# Patient Record
Sex: MALE
[De-identification: names, ages, dates, MRNs, and addresses within clinical notes are randomized per-mention and may not be internally consistent; named-entity substitution may affect disease eponyms.]

---

## 2024-02-07 ENCOUNTER — APPOINTMENT (OUTPATIENT)
Dept: ORTHOPEDIC SURGERY | Facility: CLINIC | Age: 59
End: 2024-02-07
Payer: COMMERCIAL

## 2024-02-07 ENCOUNTER — NON-APPOINTMENT (OUTPATIENT)
Age: 59
End: 2024-02-07

## 2024-02-07 VITALS
HEIGHT: 76 IN | DIASTOLIC BLOOD PRESSURE: 87 MMHG | SYSTOLIC BLOOD PRESSURE: 130 MMHG | BODY MASS INDEX: 20.7 KG/M2 | HEART RATE: 86 BPM | WEIGHT: 170 LBS

## 2024-02-07 DIAGNOSIS — M79.642 PAIN IN RIGHT HAND: ICD-10-CM

## 2024-02-07 DIAGNOSIS — M79.641 PAIN IN RIGHT HAND: ICD-10-CM

## 2024-02-07 PROCEDURE — 99204 OFFICE O/P NEW MOD 45 MIN: CPT

## 2024-02-07 PROCEDURE — 73110 X-RAY EXAM OF WRIST: CPT | Mod: 50

## 2024-02-07 PROCEDURE — 73130 X-RAY EXAM OF HAND: CPT | Mod: 50

## 2024-02-07 NOTE — DISCUSSION/SUMMARY
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   I had a discussion with the patient regarding today's visit, the prognosis of this diagnosis, and treatment recommendations and options. At this time, I discussed the option of surgical management.  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  In terms of surgery, I would recommend a right wrist proximal row carpectomy, PIN/AIN neurectomy and open carpal tunnel release and release of the ulnar nerve at Guyon's canal.  It is possible that his lunate may need to be excised through the carpal tunnel.  He has agreed to proceed.  He will be scheduled sometime in the near future.  -  The nature and purposes of the operation/procedure was discussed in detail.  I discussed the surgical procedure in detail, as well as expected postoperative recovery and outcome. -  Possible risks, benefits, and complications (from known and unknown causes) of the procedure were discussed in detail.  -  Possible non-operative alternatives to the proposed treatment were discussed in detail.  -  He was told that possible risks/complications include, but are not limited to:  Infection, nerve or vessel injury, stiffness, painful scar, poor outcome, need for additional surgical procedures, and other unforeseen complications.  -  In addition, the possibility of an "unsuccessful outcome," despite "successful surgery," was discussed with him.  He understands that his wrist will not be "normal."  He understands the risk of further problems secondary to arthritis, instability of his proximal carpectomy, and he also understands that given the chronic compression of his median and ulnar nerves secondary to the dislocated lunate, he may not regain full function of his nerves.  He obviously has a severe ulnar neuropathy based upon his examination and his EMGs.  Knowing this, he has agreed to proceed. -  The patient fully understands these risks and wishes to proceed.  -  I had a lengthy discussion with the patient regarding today's visit, the diagnosis, and my surgical treatment recommendations.  The patient has agreed to this plan of management and has expressed full understanding.  All questions were fully answered to the patient's satisfaction.   My cumulative time spent on this visit included: Preparation for the visit, review of the medical records, review of pertinent diagnostic studies, examination and counseling of the patient on the above diagnosis, treatment plan and prognosis, orders of diagnostic tests, medication and/or appropriate procedures and documentation in the medical records of today's visit.

## 2024-02-07 NOTE — PHYSICAL EXAM
[de-identified] : - Constitutional: This is a male in no obvious distress.  - Psych: Patient is alert and oriented to person, place and time.  Patient has a normal mood and affect. - Cardiovascular: Normal pulses throughout the upper extremities.  No significant varicosities are noted in the upper extremities. - Respiratory:  Patient exhibits no evidence of shortness of breath or difficulty breathing. - Skin: No rashes, lesions, or other abnormalities are noted in the upper extremities. ---  Examination of his right wrist and hand demonstrates obvious swelling dorsally at the wrist capsule.  He has tenderness along the dorsal wrist capsule.  He has approximately 15 degrees of wrist flexion and extension.  He has full composite flexion of the digits into the palm.  His flexor and extensor tendons are intact.  There is obvious ulnar atrophy with significant first dorsal interosseous wasting.  There is significant weakness of the ulnar nerve innervated intrinsics within the hand.  Provocative signs for carpal tunnel syndrome are positive.  In addition, he has a positive Tinel along the ulnar nerve at Guyon's canal.  Provocative signs for cubital tunnel syndrome are negative.  He has decreased sensation to light touch most notably along the ulnar nerve distribution, less so along the median nerve distribution with normal sensation along the radial nerve distribution. [de-identified] :  PA, lateral, oblique, PA  and ulna deviation radiographs of the left wrist demonstrate advanced STT joint arthritis.    PA, lateral, oblique, PA  and ulna deviation radiographs of the right wrist demonstrate evidence of what appears to be volar lunate dislocation with a vascular necrosis of the lunate.  I reviewed MRI of the right wrist dated 2/7/2024 demonstrated dorsal angulation of the lunate indicating the presence of dorsal intercalated segmental instability (DISI). Sclerosis of the lunate, consistent with presence of avascular necrosis.  His lunate is volarly dislocated towards the carpal tunnel. Complete tears of the scapholunate and lunotriquetral ligaments.  Complete tear of the styloid attachment of the triangular fibrocartilage. Partial tear of the foveal attachment.  Mild extensor carpal ulnaris tendinosis, without tears.

## 2024-02-07 NOTE — PRE-SURGICAL ASSESSMENT
[PST Chart Review] : PST Chart Review (intermediate patient risk, very low procedural risk) [2 weeks] : 2 weeks [none] : none [S: Do you SNORE loudly] : snores loudly [A: AGE over 50 years old] : over 50 years of age [G: GENDER (birth sex) = Male] : birth sex is male [3 - 4: Intermediate Risk] : 3 - 4: Intermediate Risk [Willing to accept blood - if not, specify reason >>] : Willing to accept blood [No] : no [not applicable] : not applicable [T: Are you TIRED, fatigued, or sleepy during the daytime] : not tired, fatigued, or sleepy during the daytime [O: Has anyone OBSERVED you stop breathing during your sleep] : not observed to have stopped during sleep [P: Do you have, or are you being treated for, high blood PRESSURE] : no hypertension, not treated for high blood pressure [B: BMI greater than 35 kG/m2] : BMI less than 35 kG/m2 [N: NECK circumference greater than 16 inches] : neck circumference less than 16 inches [Devices being used in treatment (i.e., pacemaker, defib, loop recorder, stimulator, pain pump, other devices/implantables)] : No devices in use in treatment [Advance Directive in Chart] : No advance directive in chart [Health Care Proxy information in chart] : No Health Care Proxy information in chart

## 2024-02-07 NOTE — HISTORY OF PRESENT ILLNESS
[Right] : right hand dominant [FreeTextEntry1] : He comes in today for evaluation of right wrist pain after he fell off a ladder about 3 months ago. He has numbness/tingling, stiffness, swelling and weakness. He states that he went to physical therapy and was seen by another hand specialist in the hospital. He also states that he had an EMG done.  He has complaints of pain, weakness and stiffness and is most concerned about weakness of his hand and numbness and tingling, most notably along the ulnar nerve distribution.  Patient is a smokes and states that he recently started back on cigarettes.   I reviewed EMG dated 12/19/2023 which demonstrated no electrophysiological evidence of cervical radiculopathy. There is evidence of a moderate right and milk left carpal tunnel syndrome (median nerve entrapment of the wrist) affecting sensory and motor components. The above electrodiagnostic study reveals evidence of a severe right sensorimotor ulnar nerve neuropathy at the wrist.

## 2024-02-09 ENCOUNTER — NON-APPOINTMENT (OUTPATIENT)
Age: 59
End: 2024-02-09

## 2024-02-14 ENCOUNTER — TRANSCRIPTION ENCOUNTER (OUTPATIENT)
Age: 59
End: 2024-02-14

## 2024-02-14 NOTE — PRE PROCEDURE NOTE - PRE PROCEDURE EVALUATION
Name:  SARAH BETH TIDWELL  MRN:  [id number]  Appointment Date:  2024  1:00PM  :  1965  Documented By:  JUAN BLANCO  Documented Status:  Amended, Final        Reason For Visit       Hand & Wrist - Reason for Visit: SARAH BETH TIDWELL is being seen for an initial visit for. bilateral wrists.      History of Present Illness       HPI:    SARAH BETH TIDWELL is a 58 year old right hand dominant male. He comes in today for evaluation of right wrist pain after he fell off a ladder about 3 months ago. He has numbness/tingling, stiffness, swelling and weakness. He states that he went to physical therapy and was seen by another hand specialist in the hospital. He also states that he had an EMG done. He has complaints of pain, weakness and stiffness and is most concerned about weakness of his hand and numbness and tingling, most notably along the ulnar nerve distribution.    Patient is a smokes and states that he recently started back on cigarettes.     I reviewed EMG dated 2023 which demonstrated no electrophysiological evidence of cervical radiculopathy. There is evidence of a moderate right and milk left carpal tunnel syndrome (median nerve entrapment of the wrist) affecting sensory and motor components. The above electrodiagnostic study reveals evidence of a severe right sensorimotor ulnar nerve neuropathy at the wrist.      Past Medical History     1. History of Pain in both hands (729.5) (M79.641,M79.642)           Review of Systems       Complete ROS:    Hand Dominance: right.    Neurological:. see HPI.    Constitutional, Eyes, ENT, Cardiovascular, Respiratory, Gastrointestinal, Genitourinary, Integumentary, Psychiatric, Endocrine, Heme/Lymph and Allergic/Immunologic review of systems are otherwise negative except as noted in HPI.      Vitals  Vital Signs   Recorded: [id number] 01:52PM   Systolic: 130  Diastolic: 87  Height: 6 ft 4 in  Weight: 170 lb   BMI Calculated: 20.69 kg/m2  BSA Calculated: 2.07 m2  Height Comment: Stated  Weight Comment: Stated  Heart Rate: 86              Physical Exam       - Constitutional: This is a male in no obvious distress.   - Psych: Patient is alert and oriented to person, place and time. Patient has a normal mood and affect.  - Cardiovascular: Normal pulses throughout the upper extremities. No significant varicosities are noted in the upper extremities.  - Respiratory: Patient exhibits no evidence of shortness of breath or difficulty breathing.  - Skin: No rashes, lesions, or other abnormalities are noted in the upper extremities.  ---    Examination of his right wrist and hand demonstrates obvious swelling dorsally at the wrist capsule. He has tenderness along the dorsal wrist capsule. He has approximately 15 degrees of wrist flexion and extension. He has full composite flexion of the digits into the palm. His flexor and extensor tendons are intact. There is obvious ulnar atrophy with significant first dorsal interosseous wasting. There is significant weakness of the ulnar nerve innervated intrinsics within the hand. Provocative signs for carpal tunnel syndrome are positive. In addition, he has a positive Tinel along the ulnar nerve at Guyon's canal. Provocative signs for cubital tunnel syndrome are negative. He has decreased sensation to light touch most notably along the ulnar nerve distribution, less so along the median nerve distribution with normal sensation along the radial nerve distribution.     Imaging: PA, lateral, oblique, PA  and ulna deviation radiographs of the left wrist demonstrate advanced STT joint arthritis.      PA, lateral, oblique, PA  and ulna deviation radiographs of the right wrist demonstrate evidence of what appears to be volar lunate dislocation with a vascular necrosis of the lunate.    I reviewed MRI of the right wrist dated 2024 demonstrated dorsal angulation of the lunate indicating the presence of dorsal intercalated segmental instability (DISI). Sclerosis of the lunate, consistent with presence of avascular necrosis. His lunate is volarly dislocated towards the carpal tunnel.  Complete tears of the scapholunate and lunotriquetral ligaments.   Complete tear of the styloid attachment of the triangular fibrocartilage. Partial tear of the foveal attachment.   Mild extensor carpal ulnaris tendinosis, without tears.         Pre-Surgical Assessment          Recommended PST type based on risk: PST Chart Review (intermediate patient risk, very low procedural risk)        Surgery or procedure potential timeframe: 2 weeks    Anesthesia History: Previous Reactions to Anesthesia: none    STOP BANG Assessment: snores loudly, over 50 years of age and birth sex is male, but not tired, fatigued, or sleepy during the daytime, not observed to have stopped during sleep, no hypertension, not treated for high blood pressure, BMI less than 35 kG/m2 and neck circumference less than 16 inches    STOP BANG Score: 3 - 4: Intermediate Risk    Transfusion History - Blood Acceptance/Avoidance/Restrictions: Willing to accept blood    Previous blood transfusion: no    External/internal devices being used in treatment of patient: No devices in use in treatment    Reproductive, Female - Is patient pregnant? 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     I had a discussion with the patient regarding today's visit, the prognosis of this diagnosis, and treatment recommendations and options. At this time, I discussed the option of surgical management. 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 In terms of surgery, I would recommend a right wrist proximal row carpectomy, PIN/AIN neurectomy and open carpal tunnel release and release of the ulnar nerve at Guyon's canal. It is possible that his lunate may need to be excised through the carpal tunnel. He has agreed to proceed. He will be scheduled sometime in the near future.    - The nature and purposes of the operation/procedure was discussed in detail. I discussed the surgical procedure in detail, as well as expected postoperative recovery and outcome.  - Possible risks, benefits, and complications (from known and unknown causes) of the procedure were discussed in detail.   - Possible non-operative alternatives to the proposed treatment were discussed in detail.   - He was told that possible risks/complications include, but are not limited to: Infection, nerve or vessel injury, stiffness, painful scar, poor outcome, need for additional surgical procedures, and other unforeseen complications.   - In addition, the possibility of an "unsuccessful outcome," despite "successful surgery," was discussed with him. He understands that his wrist will not be "normal." He understands the risk of further problems secondary to arthritis, instability of his proximal carpectomy, and he also understands that given the chronic compression of his median and ulnar nerves secondary to the dislocated lunate, he may not regain full function of his nerves. He obviously has a severe ulnar neuropathy based upon his examination and his EMGs. Knowing this, he has agreed to proceed.  - The patient fully understands these risks and wishes to proceed.   - I had a lengthy discussion with the patient regarding today's visit, the diagnosis, and my surgical treatment recommendations. The patient has agreed to this plan of management and has expressed full understanding. All questions were fully answered to the patient's satisfaction.     My cumulative time spent on this visit included: Preparation for the visit, review of the medical records, review of pertinent diagnostic studies, examination and counseling of the patient on the above diagnosis, treatment plan and prognosis, orders of diagnostic tests, medication and/or appropriate procedures and documentation in the medical records of today's visit.         Assessment     1. Dislocation of lunate bone of right wrist (833.09) (S63.094A)   2. Carpal tunnel syndrome of right wrist (354.0) (G56.01)   3. Neuropathy of right ulnar nerve at wrist (354.2) (G56.21)           Plan     1. Surgery Booking Form Inpatient  .  Status: Active  Requested for: 2024  Did the patient receive COVID 19 vaccine? : Yes, Patient stated  ERP (Enhanced Recovery Program) : No  Flouro : N  X-Ray : N  MRI : N  AICD : N  Pacer : N  Diabetic : N  Latex Allergy : N  Frozen Section : N  Biopsy Done : N  Medical Clearance : Yes  PST : Required  Admission Type : OP  Anesthesia Alert : N  Anesthesia Type : Regional  Laterality : Right  See Attached Form : Mini C arm  Special Equipment : Y  Length of Procedure : 2.5 hours  Procedure Description: : 1. Right wrist proximal row carpectomy (60848)      2. Right wrist posterior interosseous nerve neurectomy (17168)      3. Right wrist anterior interosseous nerve neurectomy (38671)      4. Right open carpal tunnel release      5. Right ulnar nerve release at Guyon's canal (28526)  CPT Codes : 41798, 98363, 30962, 18719, 48610  Weight : 170  Height : 6'4  Date of Surgery and Time : 2024  Policy # : .     2. Xray Hand AP + Lat + Obl, Bilat; Status:Complete - Retrospective By Protocol   Authorization;   Done: 2024   3. Xray Wrist 3 Views, Bilateral; Status:Complete - Retrospective By Protocol Authorization;     Done: 2024              Signatures        Electronically signed by : DAYANA MCPHERSON, ; 2024  3:07PM Eastern Standard Time (Author)    Electronically signed by : KEILA HAYES NP; 2024  3:10PM Eastern Standard Time (Author)    Electronically signed by : JUAN BLANCO MD; 2024  4:51PM Eastern Standard Time (Author)

## 2024-02-14 NOTE — PRE PROCEDURE NOTE - GENERAL PROCEDURE NAME
Right wrist proximal row carpectomy/right wrist posterior neurectomy/right wrist anterior interosseous nerve neurectomy/right open carpal tunnel release/right ulnar nerve release

## 2024-02-15 ENCOUNTER — RESULT REVIEW (OUTPATIENT)
Age: 59
End: 2024-02-15

## 2024-02-15 ENCOUNTER — TRANSCRIPTION ENCOUNTER (OUTPATIENT)
Age: 59
End: 2024-02-15

## 2024-02-15 ENCOUNTER — OUTPATIENT (OUTPATIENT)
Dept: OUTPATIENT SERVICES | Facility: HOSPITAL | Age: 59
LOS: 1 days | End: 2024-02-15
Payer: COMMERCIAL

## 2024-02-15 ENCOUNTER — APPOINTMENT (OUTPATIENT)
Dept: ORTHOPEDIC SURGERY | Facility: HOSPITAL | Age: 59
End: 2024-02-15

## 2024-02-15 VITALS
RESPIRATION RATE: 15 BRPM | TEMPERATURE: 98 F | DIASTOLIC BLOOD PRESSURE: 72 MMHG | WEIGHT: 162.26 LBS | OXYGEN SATURATION: 97 % | SYSTOLIC BLOOD PRESSURE: 126 MMHG | HEART RATE: 76 BPM | HEIGHT: 72 IN

## 2024-02-15 VITALS
OXYGEN SATURATION: 98 % | SYSTOLIC BLOOD PRESSURE: 150 MMHG | DIASTOLIC BLOOD PRESSURE: 96 MMHG | HEART RATE: 82 BPM | RESPIRATION RATE: 14 BRPM

## 2024-02-15 DIAGNOSIS — G56.21 LESION OF ULNAR NERVE, RIGHT UPPER LIMB: ICD-10-CM

## 2024-02-15 DIAGNOSIS — S63.094A OTHER DISLOCATION OF RIGHT WRIST AND HAND, INITIAL ENCOUNTER: ICD-10-CM

## 2024-02-15 DIAGNOSIS — G56.01 CARPAL TUNNEL SYNDROME, RIGHT UPPER LIMB: ICD-10-CM

## 2024-02-15 PROCEDURE — 64772 INCISION OF SPINAL NERVE: CPT | Mod: RT,59

## 2024-02-15 PROCEDURE — 64719 REVISE ULNAR NERVE AT WRIST: CPT | Mod: RT,59

## 2024-02-15 PROCEDURE — 76000 FLUOROSCOPY <1 HR PHYS/QHP: CPT | Mod: 26,RT,59

## 2024-02-15 PROCEDURE — 88311 DECALCIFY TISSUE: CPT | Mod: 26

## 2024-02-15 PROCEDURE — 25215 REMOVAL OF WRIST BONES: CPT | Mod: RT

## 2024-02-15 PROCEDURE — 76000 FLUOROSCOPY <1 HR PHYS/QHP: CPT

## 2024-02-15 PROCEDURE — 88307 TISSUE EXAM BY PATHOLOGIST: CPT | Mod: 26

## 2024-02-15 PROCEDURE — 88311 DECALCIFY TISSUE: CPT

## 2024-02-15 PROCEDURE — 64721 CARPAL TUNNEL SURGERY: CPT | Mod: RT

## 2024-02-15 PROCEDURE — 88307 TISSUE EXAM BY PATHOLOGIST: CPT

## 2024-02-15 PROCEDURE — 64772 INCISION OF SPINAL NERVE: CPT | Mod: RT

## 2024-02-15 PROCEDURE — 64721 CARPAL TUNNEL SURGERY: CPT | Mod: RT,59

## 2024-02-15 RX ORDER — ASPIRIN/CALCIUM CARB/MAGNESIUM 324 MG
1 TABLET ORAL
Refills: 0 | DISCHARGE

## 2024-02-15 RX ORDER — ATORVASTATIN CALCIUM 80 MG/1
1 TABLET, FILM COATED ORAL
Refills: 0 | DISCHARGE

## 2024-02-15 RX ORDER — ONDANSETRON 8 MG/1
4 TABLET, FILM COATED ORAL ONCE
Refills: 0 | Status: DISCONTINUED | OUTPATIENT
Start: 2024-02-15 | End: 2024-02-15

## 2024-02-15 RX ORDER — ACETAMINOPHEN 500 MG
1000 TABLET ORAL ONCE
Refills: 0 | Status: COMPLETED | OUTPATIENT
Start: 2024-02-15 | End: 2024-02-15

## 2024-02-15 RX ORDER — APREPITANT 80 MG/1
40 CAPSULE ORAL ONCE
Refills: 0 | Status: COMPLETED | OUTPATIENT
Start: 2024-02-15 | End: 2024-02-15

## 2024-02-15 RX ORDER — HYDROMORPHONE HYDROCHLORIDE 2 MG/ML
1 INJECTION INTRAMUSCULAR; INTRAVENOUS; SUBCUTANEOUS
Refills: 0 | Status: DISCONTINUED | OUTPATIENT
Start: 2024-02-15 | End: 2024-02-15

## 2024-02-15 RX ORDER — OMEPRAZOLE 10 MG/1
1 CAPSULE, DELAYED RELEASE ORAL
Refills: 0 | DISCHARGE

## 2024-02-15 RX ORDER — CELECOXIB 200 MG/1
1 CAPSULE ORAL
Qty: 30 | Refills: 0
Start: 2024-02-15

## 2024-02-15 RX ORDER — HYDROMORPHONE HYDROCHLORIDE 2 MG/ML
0.5 INJECTION INTRAMUSCULAR; INTRAVENOUS; SUBCUTANEOUS
Refills: 0 | Status: DISCONTINUED | OUTPATIENT
Start: 2024-02-15 | End: 2024-02-15

## 2024-02-15 RX ORDER — OXYCODONE HYDROCHLORIDE 5 MG/1
5 TABLET ORAL ONCE
Refills: 0 | Status: DISCONTINUED | OUTPATIENT
Start: 2024-02-15 | End: 2024-02-15

## 2024-02-15 RX ORDER — ACETAMINOPHEN WITH CODEINE 300MG-30MG
1 TABLET ORAL
Qty: 5 | Refills: 0
Start: 2024-02-15

## 2024-02-15 RX ORDER — CHLORHEXIDINE GLUCONATE 213 G/1000ML
1 SOLUTION TOPICAL ONCE
Refills: 0 | Status: COMPLETED | OUTPATIENT
Start: 2024-02-15 | End: 2024-02-15

## 2024-02-15 RX ORDER — SODIUM CHLORIDE 9 MG/ML
1000 INJECTION, SOLUTION INTRAVENOUS
Refills: 0 | Status: DISCONTINUED | OUTPATIENT
Start: 2024-02-15 | End: 2024-02-15

## 2024-02-15 RX ORDER — CEFAZOLIN SODIUM 1 G
2000 VIAL (EA) INJECTION ONCE
Refills: 0 | Status: COMPLETED | OUTPATIENT
Start: 2024-02-15 | End: 2024-02-15

## 2024-02-15 RX ORDER — OXYCODONE AND ACETAMINOPHEN 5; 325 MG/1; MG/1
1 TABLET ORAL
Qty: 15 | Refills: 0
Start: 2024-02-15

## 2024-02-15 RX ADMIN — CHLORHEXIDINE GLUCONATE 1 APPLICATION(S): 213 SOLUTION TOPICAL at 12:43

## 2024-02-15 RX ADMIN — APREPITANT 40 MILLIGRAM(S): 80 CAPSULE ORAL at 12:43

## 2024-02-15 NOTE — ASU DISCHARGE PLAN (ADULT/PEDIATRIC) - PROVIDER TOKENS
PROVIDER:[TOKEN:[[id number]],SCHEDULEDAPPT:[02/21/2024]] FREE:[LAST:[Dr. Triplett],FIRST:[Milton],PHONE:[phone]],FAX:[(   )    -],ADDRESS:[[address] [place] [address]],SCHEDULEDAPPT:[02/26/2024]]

## 2024-02-15 NOTE — BRIEF OPERATIVE NOTE - NSICDXBRIEFPREOP_GEN_ALL_CORE_FT
PRE-OP DIAGNOSIS:  Carpal tunnel syndrome 15-Feb-2024 12:46:41  Tracy Fierro   PRE-OP DIAGNOSIS:  Carpal tunnel syndrome 15-Feb-2024 12:46:41  Tracy Fierro  Arthritis of wrist, right 15-Feb-2024 16:00:53  Tracy Fierro

## 2024-02-15 NOTE — ASU DISCHARGE PLAN (ADULT/PEDIATRIC) - CARE PROVIDER_API CALL
Milton Triplett)  Surgery of the Hand  [address] [facility] [address], [address]  [place] [address]  Phone: [phone]  Fax: [phone]  Scheduled Appointment: 02/21/2024   Dr. Triplett, [address] [place] [address]  Phone: [phone]  Fax: (   )    -  Scheduled Appointment: 02/26/2024

## 2024-02-15 NOTE — ASU DISCHARGE PLAN (ADULT/PEDIATRIC) - PROCEDURE
Left endoscopic carpal tunnel release Right proximal row carpectomy, carpal and ulnar tunnel release

## 2024-02-15 NOTE — BRIEF OPERATIVE NOTE - NSICDXBRIEFPROCEDURE_GEN_ALL_CORE_FT
PROCEDURES:  Carpal tunnel release, endoscopic 15-Feb-2024 12:46:25  Tracy Fierro   PROCEDURES:  Carpal tunnel release, endoscopic 15-Feb-2024 12:46:25  Tracy Fierro  Excision of proximal row of carpal bones of right wrist 15-Feb-2024 16:00:19  Tracy Fierro  Ulnar tunnel release 15-Feb-2024 16:00:36  Tracy Fierro

## 2024-02-15 NOTE — BRIEF OPERATIVE NOTE - PRIMARY SURGEON
Future appt scheduled 0 appt scheduled             Last appt 09/07/2021      Last Written 07/20/2021    buPROPion (WELLBUTRIN XL) 150 MG extended release tablet  #90  1 RF
Dr.Richard Triplett

## 2024-02-15 NOTE — BRIEF OPERATIVE NOTE - NSICDXBRIEFPOSTOP_GEN_ALL_CORE_FT
POST-OP DIAGNOSIS:  Carpal tunnel syndrome 15-Feb-2024 12:46:38  Tracy Fierro   POST-OP DIAGNOSIS:  Carpal tunnel syndrome 15-Feb-2024 12:46:38  Tracy Fierro  Arthritis of right wrist 15-Feb-2024 16:01:06  Tracy Fierro

## 2024-02-16 ENCOUNTER — TRANSCRIPTION ENCOUNTER (OUTPATIENT)
Age: 59
End: 2024-02-16

## 2024-02-26 ENCOUNTER — APPOINTMENT (OUTPATIENT)
Dept: ORTHOPEDIC SURGERY | Facility: CLINIC | Age: 59
End: 2024-02-26
Payer: COMMERCIAL

## 2024-02-26 PROCEDURE — 99024 POSTOP FOLLOW-UP VISIT: CPT

## 2024-02-26 PROCEDURE — 29065 APPL CST SHO TO HAND LNG ARM: CPT | Mod: 59,RT

## 2024-02-26 NOTE — END OF VISIT
[FreeTextEntry3] : This note was written by Arcadio Jefferson on 2/26/24 acting solely as a scribe for Dr. Milton Triplett.   All medical record entries made by the Scribe were at my, Dr. Milton Triplett, direction and personally dictated by me on 2/26/24. I have personally reviewed the chart and agree that the record accurately reflects my personal performance of the history, physical exam, assessment and plan.

## 2024-02-26 NOTE — HISTORY OF PRESENT ILLNESS
[de-identified] : 5 days postoperative. [de-identified] : 5 days status post right proximal carpectomy, PIN/AIN neurectomy, open carpal tunnel release and Guyon's canal release.  Date of surgery: 2/13/2024.  He is doing well. He is having some slight numbness, but no pain, and he reports that his symptoms have improved greatly since his surgery. [de-identified] : Examination of his right wrist and hand after the splint and dressing were removed demonstrates his incisions to be clean and dry.  There is no drainage or evidence of infection.  There is mild swelling.  He has near full flexion and extension of the digits.  He has improved sensation along the ulnar nerve distribution with normal sensation along the radial and median nerve distributions. [de-identified] : Stable, 5 days postoperative. [de-identified] : The suture ends were cut and Steri-Strips were applied.  He was placed into a well-padded well molded right short arm fiberglass cast.  He was instructed on cast care, range of motion exercises to the digits and activity modification. He will follow-up in 3 weeks for cast removal. I provided him a work note to return to full duty beginning on 3/11/24.  He will follow-up before then if there are any problems or concerns.

## 2024-02-26 NOTE — ADDENDUM
[FreeTextEntry1] : I, Arcadio Jefferson, acted solely as a scribe for Dr. Triplett on this date on 2/26/24.

## 2024-03-07 ENCOUNTER — NON-APPOINTMENT (OUTPATIENT)
Age: 59
End: 2024-03-07

## 2024-03-18 ENCOUNTER — APPOINTMENT (OUTPATIENT)
Dept: ORTHOPEDIC SURGERY | Facility: CLINIC | Age: 59
End: 2024-03-18
Payer: COMMERCIAL

## 2024-03-18 PROCEDURE — 73110 X-RAY EXAM OF WRIST: CPT | Mod: RT

## 2024-03-18 PROCEDURE — 99024 POSTOP FOLLOW-UP VISIT: CPT

## 2024-03-18 NOTE — HISTORY OF PRESENT ILLNESS
[de-identified] : 5 weeks postoperative. [de-identified] : Examination of his right wrist and hand after the cast was removed demonstrates his incisions to be healing well.  There is decreased swelling.  He has near full flexion and extension of the digits.  He has improved sensation along the ulnar nerve distribution with normal sensation along the radial and median nerve distributions. [de-identified] : PA, lateral oblique radiographs of his right wrist demonstrate his proximal row carpectomy to be well aligned.  The capitate is sitting slightly ulnarly in the lunate facet, but this appears positional. [de-identified] : 5 weeks status post right proximal carpectomy, PIN/AIN neurectomy, open carpal tunnel release and Guyon's canal release.  Date of surgery: 2/13/2024.  Overall, he is doing well.  He has minimal discomfort and has noted improvement in his finger extension and numbness and tingling. [de-identified] : At this time he was instructed on protection removable splint.  He was instructed on range of motion exercises and scar massage and desensitization.  He was referred to hand therapy.  He will follow-up in 4 weeks. [de-identified] : Stable, 5 weeks postoperative.

## 2024-03-28 ENCOUNTER — NON-APPOINTMENT (OUTPATIENT)
Age: 59
End: 2024-03-28

## 2024-04-08 ENCOUNTER — APPOINTMENT (OUTPATIENT)
Dept: ORTHOPEDIC SURGERY | Facility: CLINIC | Age: 59
End: 2024-04-08
Payer: COMMERCIAL

## 2024-04-08 PROCEDURE — 99024 POSTOP FOLLOW-UP VISIT: CPT

## 2024-04-08 NOTE — HISTORY OF PRESENT ILLNESS
[de-identified] : 8 weeks postoperative. [de-identified] : 8 weeks status post right proximal carpectomy, PIN/AIN neurectomy, open carpal tunnel release and Guyon's canal release.  Date of surgery: 2/13/2024.  Overall, he is doing well.  He has minimal discomfort and has noted improvement in his finger extension and numbness and tingling, although he still notes some numbness in his right palm. [de-identified] : Examination of his right wrist and hand demonstrates his incisions to be healing well.  There is decreased swelling.  He has full flexion and extension of the digits.  He has improved sensation along the ulnar nerve distribution with normal sensation along the radial and median nerve distributions.  He also has improved intrinsic strength within the hand. [de-identified] : PA, lateral oblique radiographs of his right wrist dated 3/18/2024 demonstrated his proximal row carpectomy to be well aligned.  The capitate is sitting slightly ulnarly in the lunate facet, but this appears positional. [de-identified] : Stable, 8 weeks postoperative. [de-identified] : He was instructed on continued hand therapy, range of motion exercises and scar massage and desensitization. He was instructed on using a brace during strenuous activities. He will follow-up in 6 weeks.

## 2024-04-08 NOTE — END OF VISIT
[FreeTextEntry3] : This note was written by Arcadio Jefferson on 04/08/2024 acting solely as a scribe for Dr. Milton Triplett.   All medical record entries made by the Scribe were at my, Dr. Milton Triplett, direction and personally dictated by me on 04/08/2024. I have personally reviewed the chart and agree that the record accurately reflects my personal performance of the history, physical exam, assessment and plan.

## 2024-04-08 NOTE — ADDENDUM
[FreeTextEntry1] : I, Arcadio Jefferson, acted solely as a scribe for Dr. Triplett on this date on 04/08/2024.

## 2024-04-18 ENCOUNTER — NON-APPOINTMENT (OUTPATIENT)
Age: 59
End: 2024-04-18

## 2024-04-22 ENCOUNTER — APPOINTMENT (OUTPATIENT)
Dept: ORTHOPEDIC SURGERY | Facility: CLINIC | Age: 59
End: 2024-04-22

## 2024-05-09 ENCOUNTER — NON-APPOINTMENT (OUTPATIENT)
Age: 59
End: 2024-05-09

## 2024-05-16 PROBLEM — S63.094A: Status: ACTIVE | Noted: 2024-02-07

## 2024-05-16 PROBLEM — G56.01 CARPAL TUNNEL SYNDROME OF RIGHT WRIST: Status: ACTIVE | Noted: 2024-02-07

## 2024-05-16 PROBLEM — G56.21 NEUROPATHY OF RIGHT ULNAR NERVE AT WRIST: Status: ACTIVE | Noted: 2024-02-07

## 2024-05-20 ENCOUNTER — APPOINTMENT (OUTPATIENT)
Dept: ORTHOPEDIC SURGERY | Facility: CLINIC | Age: 59
End: 2024-05-20
Payer: COMMERCIAL

## 2024-05-20 DIAGNOSIS — G56.01 CARPAL TUNNEL SYNDROME, RIGHT UPPER LIMB: ICD-10-CM

## 2024-05-20 DIAGNOSIS — G56.21 LESION OF ULNAR NERVE, RIGHT UPPER LIMB: ICD-10-CM

## 2024-05-20 DIAGNOSIS — S63.094A OTHER DISLOCATION OF RIGHT WRIST AND HAND, INITIAL ENCOUNTER: ICD-10-CM

## 2024-05-20 PROCEDURE — 73110 X-RAY EXAM OF WRIST: CPT | Mod: RT

## 2024-05-20 PROCEDURE — 99213 OFFICE O/P EST LOW 20 MIN: CPT

## 2024-05-20 NOTE — ADDENDUM
[FreeTextEntry1] : I, Arcadio Jefferson, acted solely as a scribe for Dr. Triplett on this date on 5/20/24.

## 2024-05-20 NOTE — HISTORY OF PRESENT ILLNESS
[de-identified] : Greater than 3 months postoperative. [de-identified] : Greater than 3 months status post right proximal carpectomy, PIN/AIN neurectomy, open carpal tunnel release and Guyon's canal release.  Date of surgery: 2/13/2024.  He is doing well. He has less pain and swelling, although he has some mild residual numbness. [de-identified] : Examination of his right wrist and hand demonstrates his incisions to be well-healed.  There is no residual swelling.  He has 40 degrees of wrist flexion and extension.  He has full flexion and extension of the digits.  He has improved sensation along the ulnar nerve distribution with normal sensation along the radial and median nerve distributions.  He also has improved intrinsic strength within the hand with residual first dorsal interosseous wasting. [de-identified] : PA, lateral oblique radiographs of his right wrist demonstrated his proximal row carpectomy to be well aligned.  The capitate is sitting slightly ulnarly in the lunate facet, but this appears positional. [de-identified] : Stable, and clinically improved, greater than 3 months postoperative. [de-identified] : He was instructed on continue range of motion exercises and strengthening.  He will advance his activities, according to his symptoms.  He will follow-up according top symptoms.

## 2024-05-20 NOTE — END OF VISIT
[FreeTextEntry3] : This note was written by Arcadio Jefferson on 5/20/24 acting solely as a scribe for Dr. Milton Triplett.   All medical record entries made by the Scribe were at my, Dr. Milton Triplett, direction and personally dictated by me on 5/20/24. I have personally reviewed the chart and agree that the record accurately reflects my personal performance of the history, physical exam, assessment and plan.

## 2024-09-02 ENCOUNTER — NON-APPOINTMENT (OUTPATIENT)
Age: 59
End: 2024-09-02

## 2025-02-15 ENCOUNTER — NON-APPOINTMENT (OUTPATIENT)
Age: 60
End: 2025-02-15

## 2025-04-07 ENCOUNTER — NON-APPOINTMENT (OUTPATIENT)
Age: 60
End: 2025-04-07

## (undated) DEVICE — DRSG KLING 4"

## (undated) DEVICE — SLING SHOULDER IMMOBILIZER CLINIC LARGE

## (undated) DEVICE — DRAPE C ARM MINI

## (undated) DEVICE — SUT MONOSOF 5-0 18" P-12

## (undated) DEVICE — POSITIONER STRAP ARMBOARD VELCRO TS-30

## (undated) DEVICE — GLV 7.5 PROTEXIS (BLUE)

## (undated) DEVICE — DRSG STERISTRIPS 0.5 X 4"

## (undated) DEVICE — SUT MONOCRYL 4-0 27" PS-2 UNDYED

## (undated) DEVICE — SUT VICRYL 3-0 27" RB-1 UNDYED

## (undated) DEVICE — DRSG ACE BANDAGE 4"

## (undated) DEVICE — TOURNIQUET ESMARK 4"

## (undated) DEVICE — WARMING BLANKET FULL ADULT

## (undated) DEVICE — GLV 7 PROTEXIS (WHITE)

## (undated) DEVICE — PACK UPPER EXTREMITY

## (undated) DEVICE — PREP BETADINE KIT

## (undated) DEVICE — NDL HYPO REGULAR BEVEL 25G X 1.5" (BLUE)

## (undated) DEVICE — DRSG WEBRIL 3"

## (undated) DEVICE — DRAPE TOWEL BLUE 17" X 24"

## (undated) DEVICE — POSITIONER FOAM HEAD CRADLE (PINK)

## (undated) DEVICE — DRSG XEROFORM 1 X 8"

## (undated) DEVICE — SPECIMEN CONTAINER PET

## (undated) DEVICE — WARMING BLANKET LOWER ADULT

## (undated) DEVICE — DRSG XEROFORM 5 X 9"

## (undated) DEVICE — DRAPE 3/4 SHEET 52X76"

## (undated) DEVICE — POSITIONER BOOT CRADLE

## (undated) DEVICE — VENODYNE/SCD SLEEVE CALF MEDIUM

## (undated) DEVICE — SOL IRR POUR NS 0.9% 500ML

## (undated) DEVICE — TOURNIQUET CUFF 18" DUAL PORT SINGLE BLADDER W PLC  (BLACK)

## (undated) DEVICE — DRAPE SURGICAL #1010

## (undated) DEVICE — SOL IRR POUR H2O 1500ML

## (undated) DEVICE — SUT MONOCRYL 5-0 18" P-3 UNDYED

## (undated) DEVICE — DRSG MASTISOL

## (undated) DEVICE — DRSG CURITY GAUZE SPONGE 4 X 4" 12-PLY

## (undated) DEVICE — SUT POLYSORB 2-0 36" GS-21 UNDYED

## (undated) DEVICE — LAP PAD W RING 18 X 18"